# Patient Record
Sex: FEMALE | Race: BLACK OR AFRICAN AMERICAN | Employment: UNEMPLOYED | URBAN - METROPOLITAN AREA
[De-identification: names, ages, dates, MRNs, and addresses within clinical notes are randomized per-mention and may not be internally consistent; named-entity substitution may affect disease eponyms.]

---

## 2017-06-02 ENCOUNTER — HOSPITAL ENCOUNTER (OUTPATIENT)
Dept: PHYSICAL THERAPY | Age: 62
Discharge: HOME | End: 2017-06-02
Payer: SELF-PAY

## 2017-06-02 PROCEDURE — 97161 PT EVAL LOW COMPLEX 20 MIN: CPT | Performed by: PHYSICAL THERAPIST

## 2017-06-02 PROCEDURE — 97110 THERAPEUTIC EXERCISES: CPT | Performed by: PHYSICAL THERAPIST
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
## 2017-06-09 ENCOUNTER — HOSPITAL ENCOUNTER (OUTPATIENT)
Dept: PHYSICAL THERAPY | Age: 62
Discharge: HOME | End: 2017-06-09
Payer: SELF-PAY

## 2017-06-09 PROCEDURE — 97110 THERAPEUTIC EXERCISES: CPT | Performed by: PHYSICAL THERAPIST

## 2017-06-09 NOTE — PROGRESS NOTES
[facility]  [address], [address] [facility]    OUTPATIENT physical Therapy DAILY Treatment NOTe with discharge  Visit: 2    NAME: Vishal Quiroga AGE: 60 y.o. GENDER: female  DATE: 6/9/2017  REFERRING PHYSICIAN: Juice Mcclendon NP      GOALS  Short term goals  Time frame: 3 weeks  1. Patient will be compliant and independent with the initial HEP as evidenced by being able to perform without cueing. MET  2. Patient will report a 25% improvement in symptoms. MET  3. Patient report a 25% improvement in sleeping. MET  4. Patient will have an increased tolerance for walking to allow 10 minutes of the activity before symptoms start. MET  5. Patient will tolerate 15 minutes of clinic activities before onset of symptoms. MET     Long term goals  Time frame: 6 weeks  1. Patient will report pain level decrease to 4/10 to allow increased ease of movement. MET  2. Patient will have an improved score on the TXU Rigo outcome measure by 6 points to demonstrate an increase in functional activity tolerance. MET  3. Patient will be independent in final individualized HEP. MET  4. Patient will return to walking without being limited by symptoms. MET  5. Patient will sleep 6 hours without being interrupted by pain. MET       SUBJECTIVE:   \"The exercises have been going well. I do them as much as I can. I'm feeling great\"    Pain In: 0/10    OBJECTIVE DATA SUMMARY:     EXAMINATION/PRESENTATION/DECISION MAKING:   Pain:   Location: Low back pain (left >right)  Now: 0/10  Factors that improve pain: heat, exercises     Posture:    Forward head, rounded shoulders     Spinal Assessment:   Lumbar Spine (AROM)  WNL; no pain     Joint Mobility:   WNL     Palpation:   No tenderness to palpation at low back     Neurologic Assessment:  Tone: Normal  Sensation: Intact  Reflexes: NT     Special Tests:   (-) Slump test     Mobility:  Transitional Movements:No difficulty      Functional Measure:   Shawn-Arya:  on evaluation  Shawn-Arya:  on discharge 17     TREATMENT/INTERVENTION:  Modalities (Rationale): to decrease pain and muscle guarding  MHP to low back for 10 minutes in supine at end of session -held per patient      Therapeutic Exercises:  Initial HEP provided 17:  SLR, LTR, Sidelying lumbar stretch, thoracolumbar stretch with arm overhead, lumbar rotation stretch, seated hamstring stretch      PPT: 10 reps  SLR: 10 reps B  LTR: 10 reps each side   Sidelying lumbar stretch to left: 3 reps with 15 second hold  Thoracolumbar stretch with arm overhead: 5 reps with 5 second holds  Lumbar rotation stretch in sittin reps with 5 second holds  Seated hamstring stretch: 4 reps with 15 second holds   Forward flexion on blue physioball, left and right: 15 reps     Neuro Re-Education:  Discussed use of towel roll for lumbar support    Activity tolerance and post treatment pain report:   Good  Pain Out: 0/10    Education:  Education was provided to the patient on the following topics  []    No changes were made to the home exercise program.  [x]    The following changes were made to the home exercise program: Educated on continuing exercises and stretches as able  Patient verbalized understanding of the topics presented. ASSESSMENT:   Patient returns following initial evaluation. Patient presents with no pain in low back. Patient with good adherence and recall of HEP. Patient reports performing exercises and stretches multiple times each day. Patient to be discharged this session as she no longer reports low back pain. Patient has returned to performing all activities prior to accident. Patient with improvement in Shawn-Arya questionnaire from  on evaluation to  on discharge. PLAN OF CARE:   Patient will be discharged from physical therapy at this time.   Criteria for termination of care:  [x]   Goals Achieved  []   Ag [place]  []   Patient has not returned  []   Other  Plan for follow up, continuing care, or equipment recommendations: Patient educated on continuing HEP   Thank you for this referral.    Greta Farr, PT   Time Calculation: 25 mins  Patient Time in clinic:   Start Time: 1430   Stop Time: [id number]

## 2022-02-16 ENCOUNTER — HOSPITAL ENCOUNTER (EMERGENCY)
Age: 67
Discharge: HOME | End: 2022-02-16
Attending: EMERGENCY MEDICINE
Payer: MEDICARE

## 2022-02-16 VITALS
RESPIRATION RATE: 15 BRPM | TEMPERATURE: 98.2 F | HEIGHT: 62 IN | OXYGEN SATURATION: 98 % | DIASTOLIC BLOOD PRESSURE: 46 MMHG | BODY MASS INDEX: 38.64 KG/M2 | HEART RATE: 75 BPM | WEIGHT: 210 LBS | SYSTOLIC BLOOD PRESSURE: 119 MMHG

## 2022-02-16 DIAGNOSIS — N39.0 URINARY TRACT INFECTION WITHOUT HEMATURIA, SITE UNSPECIFIED: ICD-10-CM

## 2022-02-16 DIAGNOSIS — Z78.9 ALCOHOL USE: ICD-10-CM

## 2022-02-16 DIAGNOSIS — R11.10 ACUTE VOMITING: ICD-10-CM

## 2022-02-16 DIAGNOSIS — F14.10 COCAINE ABUSE (HCC): Primary | ICD-10-CM

## 2022-02-16 DIAGNOSIS — F12.10 TETRAHYDROCANNABINOL (THC) USE DISORDER, MILD, ABUSE: ICD-10-CM

## 2022-02-16 LAB
AMPHET UR QL SCN: NEGATIVE
APPEARANCE UR: ABNORMAL
ATRIAL RATE: 73 BPM
BACTERIA URNS QL MICRO: ABNORMAL /HPF
BARBITURATES UR QL SCN: NEGATIVE
BENZODIAZ UR QL: NEGATIVE
BILIRUB UR QL: NEGATIVE
CALCULATED P AXIS, ECG09: 52 DEGREES
CALCULATED R AXIS, ECG10: -9 DEGREES
CALCULATED T AXIS, ECG11: 40 DEGREES
CANNABINOIDS UR QL SCN: POSITIVE
COCAINE UR QL SCN: POSITIVE
COLOR UR: ABNORMAL
DIAGNOSIS, 93000: NORMAL
DRUG SCRN COMMENT,DRGCM: ABNORMAL
EPITH CASTS URNS QL MICRO: ABNORMAL /LPF
GLUCOSE UR STRIP.AUTO-MCNC: NEGATIVE MG/DL
HGB UR QL STRIP: ABNORMAL
KETONES UR QL STRIP.AUTO: NEGATIVE MG/DL
LEUKOCYTE ESTERASE UR QL STRIP.AUTO: ABNORMAL
METHADONE UR QL: NEGATIVE
NITRITE UR QL STRIP.AUTO: POSITIVE
OPIATES UR QL: NEGATIVE
P-R INTERVAL, ECG05: 184 MS
PCP UR QL: NEGATIVE
PH UR STRIP: 5.5 [PH] (ref 5–8)
PROT UR STRIP-MCNC: NEGATIVE MG/DL
Q-T INTERVAL, ECG07: 392 MS
QRS DURATION, ECG06: 80 MS
QTC CALCULATION (BEZET), ECG08: 431 MS
RBC #/AREA URNS HPF: ABNORMAL /HPF (ref 0–5)
SP GR UR REFRACTOMETRY: 1.01 (ref 1–1.03)
UA: UC IF INDICATED,UAUC: ABNORMAL
UROBILINOGEN UR QL STRIP.AUTO: 0.2 EU/DL (ref 0.2–1)
VENTRICULAR RATE, ECG03: 73 BPM
WBC URNS QL MICRO: ABNORMAL /HPF (ref 0–4)

## 2022-02-16 PROCEDURE — 81001 URINALYSIS AUTO W/SCOPE: CPT

## 2022-02-16 PROCEDURE — 99285 EMERGENCY DEPT VISIT HI MDM: CPT

## 2022-02-16 PROCEDURE — 74011250637 HC RX REV CODE- 250/637: Performed by: EMERGENCY MEDICINE

## 2022-02-16 PROCEDURE — 87077 CULTURE AEROBIC IDENTIFY: CPT

## 2022-02-16 PROCEDURE — 80307 DRUG TEST PRSMV CHEM ANLYZR: CPT

## 2022-02-16 PROCEDURE — 87086 URINE CULTURE/COLONY COUNT: CPT

## 2022-02-16 PROCEDURE — 87186 SC STD MICRODIL/AGAR DIL: CPT

## 2022-02-16 PROCEDURE — 93005 ELECTROCARDIOGRAM TRACING: CPT

## 2022-02-16 RX ORDER — ONDANSETRON 4 MG/1
4 TABLET, FILM COATED ORAL
Qty: 20 TABLET | Refills: 0 | Status: SHIPPED | OUTPATIENT
Start: 2022-02-16

## 2022-02-16 RX ORDER — ONDANSETRON 4 MG/1
8 TABLET, ORALLY DISINTEGRATING ORAL
Status: COMPLETED | OUTPATIENT
Start: 2022-02-16 | End: 2022-02-16

## 2022-02-16 RX ORDER — PROMETHAZINE HYDROCHLORIDE 25 MG/1
25 TABLET ORAL
Status: COMPLETED | OUTPATIENT
Start: 2022-02-16 | End: 2022-02-16

## 2022-02-16 RX ORDER — NITROFURANTOIN 25; 75 MG/1; MG/1
100 CAPSULE ORAL 2 TIMES DAILY
Qty: 10 CAPSULE | Refills: 0 | Status: SHIPPED | OUTPATIENT
Start: 2022-02-16 | End: 2022-02-21

## 2022-02-16 RX ADMIN — ONDANSETRON 8 MG: 4 TABLET, ORALLY DISINTEGRATING ORAL at 02:33

## 2022-02-16 RX ADMIN — PROMETHAZINE HYDROCHLORIDE 25 MG: 25 TABLET ORAL at 03:23

## 2022-02-16 NOTE — ED TRIAGE NOTES
Pt arrives via [facility] reporting one episode of vomiting PTA. Denies having pain. Endorses using ETOH and marijuana today. States she thinks she did not have enough to eat today.

## 2022-02-16 NOTE — DISCHARGE INSTRUCTIONS
Thank You! It was a pleasure taking care of you in our Emergency Department today. We know that when you come to [address] [place] [address], you are entrusting us with your health, comfort, and safety. Our physicians and nurses honor that trust, and truly appreciate the opportunity to care for you and your loved ones. We also value your feedback. If you receive a survey about your Emergency Department experience today, please fill it out. We care about our patients' feedback, and we listen to what you have to say. Thank you. Dr. Nano Yung M.D.      ____________________________________________________________________  I have included a copy of your lab results and/or radiologic studies from today's visit so you can have them easily available at your follow-up visit. We hope you feel better and please do not hesitate to contact the ED if you have any questions at all!     Recent Results (from the past 12 hour(s))   EKG, 12 LEAD, INITIAL    Collection Time: 02/16/22  2:32 AM   Result Value Ref Range    Ventricular Rate 73 BPM    Atrial Rate 73 BPM    P-R Interval 184 ms    QRS Duration 80 ms    Q-T Interval 392 ms    QTC Calculation (Bezet) 431 ms    Calculated P Axis 52 degrees    Calculated R Axis -9 degrees    Calculated T Axis 40 degrees    Diagnosis       Normal sinus rhythm  Minimal voltage criteria for LVH, may be normal variant  Borderline ECG  No previous ECGs available     URINALYSIS W/ REFLEX CULTURE    Collection Time: 02/16/22  2:35 AM    Specimen: Urine   Result Value Ref Range    Color YELLOW/STRAW      Appearance CLOUDY (A) CLEAR      Specific gravity 1.010 1.003 - 1.030      pH (UA) 5.5 5.0 - 8.0      Protein Negative NEG mg/dL    Glucose Negative NEG mg/dL    Ketone Negative NEG mg/dL    Bilirubin Negative NEG      Blood TRACE (A) NEG      Urobilinogen 0.2 0.2 - 1.0 EU/dL    Nitrites Positive (A) NEG      Leukocyte Esterase MODERATE (A) NEG      WBC PENDING /hpf    RBC PENDING /hpf    Epithelial cells PENDING /lpf    Bacteria PENDING /hpf    UA:UC IF INDICATED PENDING        No orders to display     CT Results  (Last 48 hours)      None          The exam and treatment you received in the Emergency Department were for an urgent problem and are not intended as complete care. It is important that you follow up with a doctor, nurse practitioner, or physician assistant for ongoing care. If your symptoms become worse or you do not improve as expected and you are unable to reach your usual health care provider, you should return to the Emergency Department. We are available 24 hours a day. Please take your discharge instructions with you when you go to your follow-up appointment. If a prescription has been provided, please have it filled as soon as possible to prevent a delay in treatment. Read the entire medication instruction sheet provided to you by the pharmacy. If you have any questions or reservations about taking the medication due to side effects or interactions with other medications, please call your primary care physician or contact the ER to speak with the charge nurse. Please make an appointment with your family doctor or the physician you were referred to for follow-up of this visit as instructed on your discharge paperwork. Return to the ER if you are unable to be seen or if you are unable to be seen in a timely manner. If you have any problem arranging the follow-up visit, contact the Emergency Department immediately.
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
## 2022-02-16 NOTE — ED PROVIDER NOTES
EMERGENCY DEPARTMENT HISTORY AND PHYSICAL EXAM      Please note that this dictation was completed with the assistance of \"Dragon\", the computer voice recognition software. Quite often unanticipated grammatical, syntax, homophones, and other interpretive errors are inadvertently transcribed by the computer software. Please disregard these errors and any errors that have escaped final proofreading. Thank you. Patient: Brook Quiroga  DOS: 22  : 1955  MRN: [id number]  History of Presenting Illness     Chief Complaint   Patient presents with    Vomiting     History Provided By: Patient/family/EMS (if applicable)    HPI: Brook Quiroga, 78 y.o. female with past medical history as documented below presents to the ED with c/o of one episode of NBNB emesis just PTA. Pt admits to drinking alcohol and smoking marijuana just PTA. She reports no pain. She is asking for food upon arrival.  Pt denies any other exacerbating or ameliorating factors. Additionally, pt specifically denies any recent fever, chills, headache, abdominal pain, CP, SOB, lightheadedness, dizziness, numbness, weakness, lower extremity swelling, heart palpitations, urinary sxs, diarrhea, constipation, melena, hematochezia, cough, or congestion. There are no other complaints, changes or physical findings pertinent to the HPI at this time. PCP: None  Past History   Past Medical History:  Past Medical History:   Diagnosis Date    Diabetes ([facility] [address]     Hypertension        Past Surgical History:  History reviewed. No pertinent surgical history. Family History:   Family history reviewed and was non-contributory, unless specified below:  History reviewed. No pertinent family history. Social History:  Social History     Tobacco Use    Smoking status: Never Smoker    Smokeless tobacco: Never Used   Substance Use Topics    Alcohol use: Yes     Comment: occ    Drug use: Yes     Types: Marijuana       Allergies:   Allergies Allergen Reactions    Penicillins Rash       Current Medications:  No current facility-administered medications on file prior to encounter. No current outpatient medications on file prior to encounter. Review of Systems   A complete ROS was reviewed by me today and all other systems negative, unless otherwise specified below:  Review of Systems   Constitutional: Negative. Negative for chills and fever. HENT: Negative. Negative for congestion and sore throat. Eyes: Negative. Respiratory: Negative. Negative for cough, chest tightness, shortness of breath and wheezing. Cardiovascular: Negative. Negative for chest pain, palpitations and leg swelling. Gastrointestinal: Positive for nausea and vomiting. Negative for abdominal distention, abdominal pain, blood in stool, constipation and diarrhea. Endocrine: Negative. Genitourinary: Negative. Negative for dysuria, flank pain, frequency, hematuria and urgency. Musculoskeletal: Negative. Negative for arthralgias, back pain and myalgias. Skin: Negative. Negative for color change and rash. Neurological: Negative. Negative for dizziness, syncope, speech difficulty, weakness, light-headedness, numbness and headaches. Hematological: Negative. Psychiatric/Behavioral: Negative. Negative for confusion and self-injury. The patient is not nervous/anxious. All other systems reviewed and are negative. Physical Exam   Physical Exam  Vitals and nursing note reviewed. Constitutional:       General: She is not in acute distress. Appearance: She is well-developed. She is not diaphoretic. HENT:      Head: Normocephalic and atraumatic. Mouth/Throat:      Pharynx: No oropharyngeal exudate. Eyes:      Conjunctiva/sclera: Conjunctivae normal.   Cardiovascular:      Rate and Rhythm: Normal rate and regular rhythm. Heart sounds: Normal heart sounds.    Pulmonary:      Effort: Pulmonary effort is normal. No respiratory distress. Breath sounds: Normal breath sounds. No wheezing or rales. Chest:      Chest wall: No tenderness. Abdominal:      General: Bowel sounds are normal. There is no distension. Palpations: Abdomen is soft. There is no mass. Tenderness: There is no abdominal tenderness. There is no guarding or rebound. Musculoskeletal:         General: Normal range of motion. Cervical back: Normal range of motion. Skin:     General: Skin is warm. Neurological:      Mental Status: She is alert and oriented to person, place, and time. Cranial Nerves: No cranial nerve deficit. Motor: No abnormal muscle tone. Diagnostic Study Results     Laboratory Data  I have personally reviewed and interpreted all available laboratory results.    Recent Results (from the past 24 hour(s))   EKG, 12 LEAD, INITIAL    Collection Time: 02/16/22  2:32 AM   Result Value Ref Range    Ventricular Rate 73 BPM    Atrial Rate 73 BPM    P-R Interval 184 ms    QRS Duration 80 ms    Q-T Interval 392 ms    QTC Calculation (Bezet) 431 ms    Calculated P Axis 52 degrees    Calculated R Axis -9 degrees    Calculated T Axis 40 degrees    Diagnosis       Normal sinus rhythm  Minimal voltage criteria for LVH, may be normal variant  Borderline ECG  No previous ECGs available  Confirmed by Micheal Huntley M.D., Jamal Magallanes ([id number]) on 2/16/2022 8:40:17 AM     URINALYSIS W/ REFLEX CULTURE    Collection Time: 02/16/22  2:35 AM    Specimen: Urine   Result Value Ref Range    Color YELLOW/STRAW      Appearance CLOUDY (A) CLEAR      Specific gravity 1.010 1.003 - 1.030      pH (UA) 5.5 5.0 - 8.0      Protein Negative NEG mg/dL    Glucose Negative NEG mg/dL    Ketone Negative NEG mg/dL    Bilirubin Negative NEG      Blood TRACE (A) NEG      Urobilinogen 0.2 0.2 - 1.0 EU/dL    Nitrites Positive (A) NEG      Leukocyte Esterase MODERATE (A) NEG      WBC 20-50 0 - 4 /hpf    RBC 0-5 0 - 5 /hpf    Epithelial cells FEW FEW /lpf    Bacteria 2+ (A) NEG /hpf    UA:UC IF INDICATED URINE CULTURE ORDERED (A) CNI     DRUG SCREEN, URINE    Collection Time: 02/16/22  2:35 AM   Result Value Ref Range    AMPHETAMINES Negative NEG      BARBITURATES Negative NEG      BENZODIAZEPINES Negative NEG      COCAINE Positive (A) NEG      METHADONE Negative NEG      OPIATES Negative NEG      PCP(PHENCYCLIDINE) Negative NEG      THC (TH-CANNABINOL) Positive (A) NEG      Drug screen comment (NOTE)        Radiologic Studies   I have personally reviewed and interpreted all available imaging studies and agree with radiology interpretation. No orders to display     CT Results  (Last 48 hours)    None        CXR Results  (Last 48 hours)    None        Medical Decision Making   I am the first and primary ED physician for this patient's ED visit today. I reviewed our electronic medical record system for any past medical records that may contribute to the patient's current condition, including their past medical history, surgical history, social and family history. This also includes their most recent ED visits, previous hospitalizations and prior diagnostic data. I have reviewed and summarized the most pertinent findings in my HPI and MDM. Vital Signs Reviewed:  Patient Vitals for the past 24 hrs:   Temp Pulse Resp BP SpO2   02/16/22 0357 -- -- -- (!) 119/46 98 %   02/16/22 0300 -- -- -- (!) 134/58 95 %   02/16/22 0216 98.2 °F (36.8 °C) 75 15 (!) 121/56 95 %     Pulse Oximetry Analysis: 95% on RA    Cardiac Monitor:   Rate: 75 bpm  The cardiac monitor revealed the following rhythm as interpreted by me: Normal Sinus Rhythm  Cardiac monitoring was ordered to monitor patient for signs of cardiac dysrhythmia, which they are at risk for based on their history and/or risk for cardiovascular disease and/or metabolic abnormalities. EKG interpretation:   Billable EKG reviewed by ED Physician in the absence of a cardiologist: Yes  Rhythm: normal sinus rhythm; and regular .  Rate (approx.): 73; Axis: normal; P wave: normal; QRS interval: normal ; ST/T wave: normal; Other findings: normal. This EKG was interpreted by Nano Yung MD    Records Reviewed: Nursing Notes, Old Medical Records, Previous electrocardiograms, Previous Radiology Studies and Previous Laboratory Studies, EMS reports    Provider Notes (Medical Decision Making):   Patient presents with acute nausea and vomiting. Pt is afebrile with stable vitals; abdominal exam is non-peritoneal. Pt is without headache, visual disturbances, numbness, focal weakness, or ataxia and has an otherwise non-focal neuro exam. Do not suspect intra-cranial pathology. Differential includes gastritis/GERD, electrolyte disturbance, SHEY, pancreatitis, cholelithiasis, cholecystitis, hepatitis, renal pathology, ACS, gastroenteritis, infection such as UTI/PNA. Will obtain EKG, treat symptomatically and reassess. Can safely defer labs and imaging. Pt requesting food upon arrival.    ED Course:   Initial assessment performed. I discussed presenting problems and concerns, and my formulated plan for today's visit with the patient and any available family members. I have encouraged them to ask questions as they arise throughout the visit.    Social History     Tobacco Use    Smoking status: Never Smoker    Smokeless tobacco: Never Used   Substance Use Topics    Alcohol use: Yes     Comment: occ    Drug use: Yes     Types: Marijuana       ED Orders Placed:  Orders Placed This Encounter    CULTURE, URINE    URINALYSIS W/ REFLEX CULTURE    DRUG SCREEN, URINE    EKG 12 LEAD INITIAL    ondansetron (ZOFRAN ODT) tablet 8 mg    ondansetron hcl (Zofran) 4 mg tablet    nitrofurantoin, macrocrystal-monohydrate, (Macrobid) 100 mg capsule    promethazine (PHENERGAN) tablet 25 mg       ED Medications Administered During ED Course:  Medications   ondansetron (ZOFRAN ODT) tablet 8 mg (8 mg Oral Given 2/16/22 0233)   promethazine (PHENERGAN) tablet 25 mg (25 mg Oral Given 2/16/22 9944)        Progress Note:  I have just re-evaluated the patient. Patient reports improvement of sx's. I have reviewed Her vital signs and determined there is currently no worsening in their condition or physical exam. Results have been reviewed with them and their questions have been answered. We will continue to review further results as they come available. Progress Note:  I have re-examined the patient. Pt states she feels much better and symptoms improved. Tolerating oral intake. Abdomen is soft and without guarding, rebound or other peritoneal signs. I have discussed with patient the importance of close f/u and to return to the ED if symptoms don't improve or worsen. Progress Note:  Pt reassessed and symptoms noted to have improved significantly after ED treatment. Pt is clinically stable for discharge. Bita Quiroga's labs and imaging have been reviewed with her and available family. She verbally conveys understanding and agreement of the signs, symptoms, diagnosis, treatment and prognosis and additionally agrees to follow up as recommended with Dr. None and/or specialist as instructed. She agrees with the care plan we have created and conveys that all of her questions have been answered. Additionally, I have put together a packet of discharge instructions for her that include: 1) educational information regarding their diagnosis, 2) how to care for their diagnosis at home, as well a 3) list of reasons why they would want to return to the ED prior to their follow-up appointment should the patient's condition change or symptoms worsen. I have answered all questions to the patient's satisfaction. Strict return precautions given. She conveyed understanding and agreement with care plan. Vital signs stable for discharge. Disposition:  DISCHARGE  The pt is ready for discharge.  The pt's signs, symptoms, diagnosis, and discharge instructions have been discussed and pt has conveyed their understanding. The pt is to follow up as recommended or return to ER should their symptoms worsen. Plan has been discussed and pt is in agreement. Plan:  1. Return precautions as discussed with patient and available family/caregiver. 2.   Discharge Medication List as of 2/16/2022  3:52 AM      START taking these medications    Details   ondansetron hcl (Zofran) 4 mg tablet Take 1 Tablet by mouth every eight (8) hours as needed for Nausea or Vomiting., Normal, Disp-20 Tablet, R-0      nitrofurantoin, macrocrystal-monohydrate, (Macrobid) 100 mg capsule Take 1 Capsule by mouth two (2) times a day for 5 days. , Normal, Disp-10 Capsule, R-0           3. Follow-up Information     Follow up With Specialties Details Why [address] [facility] EMERGENCY DEPT Emergency Medicine  As needed, If symptoms worsen Audra Hatch        Instructed to return to ED if worse  Diagnosis   Clinical Impression:  1. Cocaine abuse ([address]    2. Acute vomiting    3. Alcohol use    4. Tetrahydrocannabinol (THC) use disorder, mild, abuse    5. Urinary tract infection without hematuria, site unspecified      Attestation:  Charles Woods MD, am the attending of record for this patient. I personally performed the services described in this documentation on this date, 2/16/2022 for patient, Vaughn Quiroga. I have reviewed the chart and verified that the record is accurate and complete.

## 2022-02-16 NOTE — ED NOTES
Pt arrives via [facility] reporting one episode of vomiting PTA. Denies having pain. Endorses using ETOH and marijuana today. States she thinks she did not have enough to eat today. Denies chest pain, denies SOB, denies abd pain. Warm blanket offered, call bell within reach, safety precautions in place, bed locked and in the lowest position. .  Emergency Department Nursing Plan of Care       The Nursing Plan of Care is developed from the Nursing assessment and Emergency Department Attending provider initial evaluation. The plan of care may be reviewed in the ED Provider note.     The Plan of Care was developed with the following considerations:   Patient / Family readiness to learn indicated by:verbalized understanding  Persons(s) to be included in education: patient  Barriers to Learning/Limitations:No    Signed     Lorna Rios, RN    2/16/2022   2:23 AM

## 2022-02-18 LAB
BACTERIA SPEC CULT: ABNORMAL
CC UR VC: ABNORMAL
SERVICE CMNT-IMP: ABNORMAL

## 2023-05-10 RX ORDER — ONDANSETRON 4 MG/1
TABLET, FILM COATED ORAL EVERY 8 HOURS PRN
COMMUNITY
Start: 2022-02-16